# Patient Record
Sex: FEMALE | Race: WHITE
[De-identification: names, ages, dates, MRNs, and addresses within clinical notes are randomized per-mention and may not be internally consistent; named-entity substitution may affect disease eponyms.]

---

## 2018-03-24 ENCOUNTER — HOSPITAL ENCOUNTER (EMERGENCY)
Dept: HOSPITAL 39 - ER | Age: 68
Discharge: HOME | End: 2018-03-24
Payer: COMMERCIAL

## 2018-03-24 VITALS — SYSTOLIC BLOOD PRESSURE: 139 MMHG | OXYGEN SATURATION: 96 % | DIASTOLIC BLOOD PRESSURE: 74 MMHG | TEMPERATURE: 98.2 F

## 2018-03-24 DIAGNOSIS — E11.9: ICD-10-CM

## 2018-03-24 DIAGNOSIS — Z79.4: ICD-10-CM

## 2018-03-24 DIAGNOSIS — S42.294A: Primary | ICD-10-CM
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
## 2018-03-24 DIAGNOSIS — I10: ICD-10-CM

## 2018-03-24 NOTE — RAD
EXAM DESCRIPTION:  Shoulder,Right 2 or More Views



CLINICAL HISTORY:  67 years  Female  fall with pain in prox

humerus and shoulder



COMPARISON:  None.



TECHNIQUE: RIGHT shoulder three view



FINDINGS:



Severely comminuted fracture of the right humeral head and neck

with impaction of the distal fracture fragment



Moderate degenerative change at the AC joint

Inferior margin the bony glenoid appears slightly irregular which

may reflect degenerative change. Possibility of bony glenoid

fracture not excluded.  

Acromioclavicular joint appears maintained.





IMPRESSION:



Comminuted impacted fracture of the proximal right humerus

involving the head and neck



Irregularity of the inferior aspect of the glenoid which may be

related to degenerative change. Fracture not excluded.



Electronically signed by:  Germania John MD  3/24/2018 4:31 PM

CDT Workstation: [id number]

## 2018-03-24 NOTE — ED.PDOC
History of Present Illness





- General


Chief Complaint: Upper Extremity Injury


Stated Complaint: Fall with right shoulder pain


Time Seen by Provider: 03/24/18 15:28


Source: patient


Exam Limitations: no limitations





- History of Present Illness


Initial Comments: 





The patient's 67-year-old  female who tripped and fell and sustained 

an injury to her right shoulder just prior to arrival.  She has pain with any 

movement and with palpation.  There is no laceration.  There is no bruising 

yet.  No pain over the clavicle or the scapula.  No neck pain.  No other 

injuries.  She does appear to be neurovascularly at her baseline. She moves the 

elbow and the hand well.


Timing/Duration: 1 hour


Severity: moderate


Improving Factors: immobilization


Worsening Factors: movement


Associated Symptoms: denies symptoms


Allergies/Adverse Reactions: 


Allergies





Erythromycin Allergy (Verified 03/24/18 15:28)


 


Penicillins Allergy (Verified 03/24/18 15:28)


 








Home Medications: 


Ambulatory Orders





Acetaminophen-Caff-Butalbital [Fioricet] 1 ea PO Q6HR PRN #60 tab 03/24/18 


Amlodipine Besylate [Amlodipine Besylate] 5 mg PO DAILY 03/24/18 


Ascorbic Acid [Vitamin C] 500 mg PO DAILY 03/24/18 


B-Complex W/Biotin & Folic Aci [Super B-Complex] 1 tab PO DAILY 03/24/18 


Cholecalciferol [Vitamin D] 5,000 unit PO DAILY 03/24/18 


Duloxetine HCl [Duloxetine HCl] 60 mg PO DAILY 03/24/18 


Fenoprofen Calcium 400 mg PO BID 03/24/18 


Insulin 70/30 (Human) [Novolin 70/30] 30 units PO BID 03/24/18 


Ipratropium Brom 0.03% Nasal [Atrovent Nasal Spray 0.03%] 2 spray BNAS QID 03/24 /18 


Linaclotide [Linzess] 145 mcg PO DAILYBK 03/24/18 


Pregabalin [Lyrica] 50 mg PO BID 03/24/18 


Ramipril [Ramipril] 10 mg PO BID 03/24/18 


Kp 1 mg PO DAILY 03/24/18 


Tizanidine HCl [Zanaflex] 2 mg PO DAILY 03/24/18 


traMADol 37.5MG/APAP 325MG [Ultracet] 2 ea PO TID PRN 03/24/18 











Review of Systems





- Review of Systems


Constitutional: States: no symptoms reported


EENTM: States: no symptoms reported


Respiratory: States: no symptoms reported


Cardiology: States: no symptoms reported


Gastrointestinal/Abdominal: States: no symptoms reported


Genitourinary: States: no symptoms reported


Musculoskeletal: States: see HPI


Skin: States: no symptoms reported


Neurological: States: no symptoms reported


Endocrine: States: no symptoms reported


All other Systems: No Change from Baseline





Past Medical History (General)





- Patient Medical History


Hx Stroke: No


Hx Dementia: No


Hx Asthma: No


Hx of COPD: No


Hx Cardiac Disorders: No


Hx Congestive Heart Failure: No


Hx Hypertension: Yes


Hx Diabetes: Yes


Surgical History: cholecystectomy, Hysterectomy, other





- Vaccination History


Hx Tetanus, Diphtheria Vaccination: Yes


Hx Influenza Vaccination: Yes


Hx Pneumococcal Vaccination: No





Family Medical History





- Family History


  ** Father


Hx Cardiac Disease: Yes - MI





Physical Exam





- Physical Exam


General Appearance: Alert, No apparent distress, Obvious distress


Eye Exam: bilateral normal


Ears, Nose, Throat: hearing grossly normal, normal ENT inspection


Neck: full range of motion, supple


Respiratory: no respiratory distress, no accessory muscle use


Cardiovascular/Chest: normal peripheral pulses, no edema


Peripheral Pulses: radial,right: 2+, radial,left: 2+


Gastrointestinal/Abdominal: other - obese


Rectal Exam: deferred


Extremity: no pedal edema, no calf tenderness, normal capillary refill, other - 

ee history of present illness.


Neurologic: CNs II-XII nml as tested, no motor/sensory deficits - no new 

deficits., alert, normal mood/affect, oriented x 3


Skin Exam: normal color


Comments: 





 Vital Signs - 24 hr











  03/24/18





  15:25


 


Temperature 98.0 F


 


Pulse Rate [ 70





left brachial] 


 


Respiratory 20





Rate 


 


Blood Pressure 138/73





[left brachial] 


 


O2 Sat by Pulse 97





Oximetry 














Progress





- Progress


Progress: 





03/24/18 17:13


the patient's 7-year-old  female presenting to the emergency room 

after a fall with right shoulder pain.  She has a closed impacted comminuted 

fracture of the proximal humerus.  The patient will be placed in a shoulder 

immobilizer.  She needs to follow up with orthopedics in the coming week to see 

if she is going to require a repair.  The patient will be written for Fioricet 

for as needed use for some pain control. ER warnings were given for any 

worsening.  Avoid future falls.





Departure





- Departure


Clinical Impression: 


Proximal humerus fracture


Qualifiers:


 Encounter type: initial encounter Fracture type: closed Fracture morphology: 

other fracture Fracture alignment: nondisplaced Laterality: right Qualified Code

(s): S42.294A - Other nondisplaced fracture of upper end of right humerus, 

initial encounter for closed fracture





Disposition: Discharge to Home or Self Care


Condition: Fair


Departure Forms:  ED Discharge - Pt. Copy, Patient Portal Self Enrollment


Diet: regular diet


Activity: no pushing/pulling with affected limb


Referrals: 


CHANDANA WRAY [Primary Care Provider] - 1-2 Weeks


Prescriptions: 


Acetaminophen-Caff-Butalbital [Fioricet] 1 ea PO Q6HR PRN #60 tab


 PRN Reason: Pain


Home Medications: 


Ambulatory Orders





Acetaminophen-Caff-Butalbital [Fioricet] 1 ea PO Q6HR PRN #60 tab 03/24/18 


Amlodipine Besylate [Amlodipine Besylate] 5 mg PO DAILY 03/24/18 


Ascorbic Acid [Vitamin C] 500 mg PO DAILY 03/24/18 


B-Complex W/Biotin & Folic Aci [Super B-Complex] 1 tab PO DAILY 03/24/18 


Cholecalciferol [Vitamin D] 5,000 unit PO DAILY 03/24/18 


Duloxetine HCl [Duloxetine HCl] 60 mg PO DAILY 03/24/18 


Fenoprofen Calcium 400 mg PO BID 03/24/18 


Insulin 70/30 (Human) [Novolin 70/30] 30 units PO BID 03/24/18 


Ipratropium Brom 0.03% Nasal [Atrovent Nasal Spray 0.03%] 2 spray BNAS QID 03/24 /18 


Linaclotide [Linzess] 145 mcg PO DAILYBK 03/24/18 


Pregabalin [Lyrica] 50 mg PO BID 03/24/18 


Ramipril [Ramipril] 10 mg PO BID 03/24/18 


Kp 1 mg PO DAILY 03/24/18 


Tizanidine HCl [Zanaflex] 2 mg PO DAILY 03/24/18 


traMADol 37.5MG/APAP 325MG [Ultracet] 2 ea PO TID PRN 03/24/18 








Additional Instructions: 


the patient's 7-year-old  female presenting to the emergency room 

after a fall with right shoulder pain.  She has a closed impacted comminuted 

fracture of the proximal humerus.  The patient will be placed in a shoulder 

immobilizer.  She needs to follow up with orthopedics in the coming week to see 

if she is going to require a repair.  The patient will be written for Fioricet 

for as needed use for some pain control. ER warnings were given for any 

worsening.  Avoid future falls.

## 2018-03-24 NOTE — RAD
EXAM DESCRIPTION:  Humerus, right



CLINICAL HISTORY:  67 years  Female  fall with pain in prox

humerus and shoulder



COMPARISON:  None.



TECHNIQUE:  Two views of the right humerus.



FINDINGS:



There are comminuted impacted fractures of the proximal right

humerus involving the head and neck. The shaft of the humerus

otherwise appears intact.



IMPRESSION:



Proximal right humerus fractures involving the head and neck. 

The shaft of the humerus otherwise appears intact.



Electronically signed by:  Pipo John MD  3/24/2018 4:52 PM CDT

Workstation: [id number]